# Patient Record
Sex: MALE | Race: WHITE
[De-identification: names, ages, dates, MRNs, and addresses within clinical notes are randomized per-mention and may not be internally consistent; named-entity substitution may affect disease eponyms.]

---

## 2017-12-06 ENCOUNTER — HOSPITAL ENCOUNTER (OUTPATIENT)
Dept: HOSPITAL 39 - GMAM | Age: 67
Discharge: HOME | End: 2017-12-06
Attending: FAMILY MEDICINE
Payer: MEDICARE

## 2017-12-06 DIAGNOSIS — Z12.5: Primary | ICD-10-CM

## 2018-12-13 ENCOUNTER — HOSPITAL ENCOUNTER (OUTPATIENT)
Dept: HOSPITAL 39 - GMAM | Age: 68
End: 2018-12-13
Attending: FAMILY MEDICINE
Payer: MEDICARE

## 2018-12-13 DIAGNOSIS — Z12.5: Primary | ICD-10-CM

## 2019-09-12 ENCOUNTER — HOSPITAL ENCOUNTER (OUTPATIENT)
Dept: HOSPITAL 39 - GMAM | Age: 69
End: 2019-09-12
Attending: FAMILY MEDICINE
Payer: MEDICARE

## 2019-09-12 DIAGNOSIS — E03.9: Primary | ICD-10-CM

## 2019-10-17 ENCOUNTER — HOSPITAL ENCOUNTER (OUTPATIENT)
Dept: HOSPITAL 39 - GMAM | Age: 69
End: 2019-10-17
Attending: FAMILY MEDICINE
Payer: MEDICARE

## 2019-10-17 DIAGNOSIS — E03.9: Primary | ICD-10-CM

## 2019-11-13 ENCOUNTER — HOSPITAL ENCOUNTER (OUTPATIENT)
Dept: HOSPITAL 39 - GMAM | Age: 69
End: 2019-11-13
Attending: FAMILY MEDICINE
Payer: MEDICARE

## 2019-11-13 DIAGNOSIS — E03.9: Primary | ICD-10-CM

## 2020-11-29 ENCOUNTER — HOSPITAL ENCOUNTER (EMERGENCY)
Dept: HOSPITAL 39 - ER | Age: 70
LOS: 1 days | Discharge: HOME | End: 2020-11-30
Payer: MEDICARE

## 2020-11-29 DIAGNOSIS — I10: ICD-10-CM

## 2020-11-29 DIAGNOSIS — J12.89: ICD-10-CM

## 2020-11-29 DIAGNOSIS — U07.1: Primary | ICD-10-CM

## 2020-11-29 DIAGNOSIS — Z79.899: ICD-10-CM

## 2020-11-29 DIAGNOSIS — E87.6: ICD-10-CM

## 2020-11-29 PROCEDURE — 85730 THROMBOPLASTIN TIME PARTIAL: CPT

## 2020-11-29 PROCEDURE — 87040 BLOOD CULTURE FOR BACTERIA: CPT

## 2020-11-29 PROCEDURE — 83735 ASSAY OF MAGNESIUM: CPT

## 2020-11-29 PROCEDURE — 86140 C-REACTIVE PROTEIN: CPT

## 2020-11-29 PROCEDURE — 82553 CREATINE MB FRACTION: CPT

## 2020-11-29 PROCEDURE — 85610 PROTHROMBIN TIME: CPT

## 2020-11-29 PROCEDURE — 85384 FIBRINOGEN ACTIVITY: CPT

## 2020-11-29 PROCEDURE — 83615 LACTATE (LD) (LDH) ENZYME: CPT

## 2020-11-29 PROCEDURE — 94640 AIRWAY INHALATION TREATMENT: CPT

## 2020-11-29 PROCEDURE — 82550 ASSAY OF CK (CPK): CPT

## 2020-11-29 PROCEDURE — 71045 X-RAY EXAM CHEST 1 VIEW: CPT

## 2020-11-29 PROCEDURE — 85379 FIBRIN DEGRADATION QUANT: CPT

## 2020-11-29 PROCEDURE — 80053 COMPREHEN METABOLIC PANEL: CPT

## 2020-11-29 PROCEDURE — 85025 COMPLETE CBC W/AUTO DIFF WBC: CPT

## 2020-11-29 PROCEDURE — 84484 ASSAY OF TROPONIN QUANT: CPT

## 2020-11-29 PROCEDURE — 83605 ASSAY OF LACTIC ACID: CPT

## 2020-11-29 PROCEDURE — 82728 ASSAY OF FERRITIN: CPT

## 2020-11-29 PROCEDURE — 83880 ASSAY OF NATRIURETIC PEPTIDE: CPT

## 2020-11-29 NOTE — ED.PDOC
History of Present Illness





- General


Chief Complaint: Respiratory Problem


Stated Complaint: COVID+, low sats


Time Seen by Provider: 11/29/20 19:49


Source: patient


Exam Limitations: no limitations





- History of Present Illness


Initial Comments: 





The patient is a 70-year-old  male presented to the emergency room 

secondary to increasing shortness of breath.  The patient was diagnosed with 

coronavirus around a week ago.  He has had body aches and some mild sore throat.

 The sore throat is largely resolved along with the runny nose several days ago.

 He does have a pulse oximeter at home it was reading in the 80s.  However since

he is arrived here our pulse oximetry is shown the lowest oxygen saturation for 

any significant period of time to be only around 90%.  He averages between 90 

and 94% on room air at rest.  Lung fields are actually fairly clear to 

auscultation.  He is not in respiratory distress.  He is alert and oriented.  He

does have a significant cough.  No nausea vomiting or diarrhea according to him.


Timing/Duration: 1 week, constant, getting worse


Severity: moderate


Improving Factors: nothing


Worsening Factors: nothing


Associated Symptoms: cough, loss of appetite, malaise, shortness of breath - 

Mild


Allergies/Adverse Reactions: 


Allergies





NO KNOWN ALLERGY Allergy (Verified 11/29/20 20:28)


   





Home Medications: 


Ambulatory Orders





Amlodipine Besylate 5 mg PO DAILY 11/29/20 


Azithromycin 500 mg PO DAILY #5 tab 11/29/20 


Dexamethasone Tab [Decadron Tab] 4 mg PO DAILY #7 tab 11/29/20 


Hydrochlorothiazide 12.5 mg PO DAILY 11/29/20 


Potassium Chloride [Potassium Chloride ER] 20 meq PO DAILY #14 tab 11/29/20 











Review of Systems





- Review of Systems


Constitutional: States: fever, malaise


EENTM: States: nose congestion - Largely resolved , throat pain - Largely 

resolved


Respiratory: States: cough, short of breath


Cardiology: States: no symptoms reported


Gastrointestinal/Abdominal: States: no symptoms reported


Genitourinary: States: no symptoms reported


Musculoskeletal: States: no symptoms reported


Skin: States: no symptoms reported


Neurological: States: headache


Endocrine: States: no symptoms reported


All other Systems: No Change from Baseline





Past Medical History (General)





- Patient Medical History


Hx Seizures: No


Hx Stroke: No


Hx Dementia: No


Hx Asthma: No


Hx of COPD: No


Hx Cardiac Disorders: No


Hx Congestive Heart Failure: No


Hx Pacemaker: No


Hx Hypertension: Yes


Hx Thyroid Disease: No


Hx Diabetes: No


Hx Gastroesophageal Reflux: No


Hx Renal Disease: No


Hx Cancer: No


Hx of HIV: No


Hx Hepatitis C: No


Hx MRSA: No





- Vaccination History


Hx Tetanus, Diphtheria Vaccination: Yes


Hx Influenza Vaccination: Yes


Hx Pneumococcal Vaccination: Yes





Family Medical History





- Family History


  ** Father


Family History: Unknown





Physical Exam





- Physical Exam


General Appearance: Alert, Anxious, No apparent distress


Eye Exam: bilateral normal


Ears, Nose, Throat: hearing grossly normal


Neck: full range of motion, supple


Respiratory: normal breath sounds, no respiratory distress, no accessory muscle 

use


Cardiovascular/Chest: normal peripheral pulses, regular rate, rhythm, no edema


Peripheral Pulses: radial,right: 2+, radial,left: 2+


Gastrointestinal/Abdominal: non tender, soft


Rectal Exam: deferred


Back Exam: no CVA tenderness, no vertebral tenderness


Extremity: normal range of motion, non-tender, normal inspection, no pedal 

edema, normal capillary refill


Neurologic: CNs II-XII nml as tested, alert, normal mood/affect - anxious, 

oriented x 3


Skin Exam: normal color


Comments: 





                               Vital Signs - 24 hr











  11/29/20 11/29/20 11/29/20





  19:50 20:24 20:30


 


Temperature  100.1 F H 


 


Pulse Rate [  98 H 90





monitor]   


 


Respiratory 20 20 16





Rate   


 


Blood Pressure  128/86 126/75





[Right Arm]   


 


O2 Sat by Pulse  94 L 93 L





Oximetry   














Progress





- Progress


Progress: 





11/29/20 21:18


The patient is a 70-year-old  male presented emergency room secondary 

to coronavirus pneumonia with some increasing shortness of breath.  He has not 

technically hypoxic but his oxygen saturations are lower than what would be 

normal.  He does have infiltrates on his chest x-ray he does have some markers 

for inflammation on his laboratory work.  The patient based on his age and 

symptomatology he does appear to be a good candidate for monoclonal antibody 

therapy.  He is going to receive an infusion of that here and be monitored for 

it.  The patient is additionally being started on an oral steroid and 

azithromycin.  He will have a trial of the nebulizer treatment.  If these are 

tolerated well but he will be sent home with prescriptions for these.  He will 

otherwise need to follow-up with his primary care doctor in the coming week and 

of course continue to isolate.  He does need to return to the emergency room for

any worsening shortness of breath.  He may need to check the batteries on his 

pulse oximeter at home.  The patient did have some hypokalemia here and did 

receive some supplemental potassium.  He will be placed on 2 weeks of oral 

potassium and does need to have this followed as an outpatient.  ER warnings are

given.





willy aguilar [id number]


11/29/20 21:20








- Results/Orders


Results/Orders: 


cxr consistent with covid with lll infiltrates. 





EKG shows normal sinus rhythm at 94 bpm.  Normal axis.  Normal R wave 

progression.  No ST segment or T wave changes indicative of acute ischemia.  

Normal QT interval.


                                Laboratory Tests











  11/29/20 11/29/20 11/29/20





  20:15 20:15 20:15


 


WBC   3.8 L 


 


RBC   4.54 L 


 


Hgb   13.9 L 


 


Hct   39.3 L 


 


MCV   86.5 


 


MCH   30.5 


 


MCHC   35.3 


 


RDW   12.8 


 


Plt Count   180 


 


MPV   7.7 


 


Absolute Neuts (auto)   2.90 


 


Absolute Lymphs (auto)   0.60 L 


 


Absolute Monos (auto)   0.30 


 


Absolute Eos (auto)   0.00 


 


Absolute Basos (auto)   0.00 


 


Neutrophils %   76.9 


 


Lymphocytes %   14.8 L 


 


Monocytes %   8.0 


 


Eosinophils %   0.1 L 


 


Basophils %   0.2 


 


PT    9.6


 


INR    < 1.00


 


PTT (SP)    30.0


 


D-Dimer, Quantitative    366.0


 


Sodium  132 L  


 


Potassium  2.9 L  


 


Chloride  95 L  


 


Carbon Dioxide  26  


 


Anion Gap  13.9  


 


BUN  11  


 


Creatinine  0.97  


 


BUN/Creatinine Ratio  11.3  


 


Random Glucose  125 H  


 


Serum Osmolality  265.4 L  


 


Lactic Acid   


 


Calcium  7.8 L  


 


Magnesium  2.2  


 


Ferritin   


 


Total Bilirubin  0.7  


 


AST  42  


 


ALT  32  


 


Alkaline Phosphatase  45  


 


LD Total   


 


Creatine Kinase  592 H*  


 


CK-MB (CK-2)  5.8 H*  


 


CK-MB (CK-2) %  0.98  


 


Troponin I  < 0.02  


 


C-Reactive Protein   


 


B-Natriuretic Peptide  35.4  


 


Serum Total Protein  7.2  


 


Albumin  3.6  


 


Globulin  3.6 H  


 


Albumin/Globulin Ratio  1.0 L  














  11/29/20 11/29/20 11/29/20





  20:15 20:15 20:15


 


WBC   


 


RBC   


 


Hgb   


 


Hct   


 


MCV   


 


MCH   


 


MCHC   


 


RDW   


 


Plt Count   


 


MPV   


 


Absolute Neuts (auto)   


 


Absolute Lymphs (auto)   


 


Absolute Monos (auto)   


 


Absolute Eos (auto)   


 


Absolute Basos (auto)   


 


Neutrophils %   


 


Lymphocytes %   


 


Monocytes %   


 


Eosinophils %   


 


Basophils %   


 


PT   


 


INR   


 


PTT (SP)   


 


D-Dimer, Quantitative   


 


Sodium   


 


Potassium   


 


Chloride   


 


Carbon Dioxide   


 


Anion Gap   


 


BUN   


 


Creatinine   


 


BUN/Creatinine Ratio   


 


Random Glucose   


 


Serum Osmolality   


 


Lactic Acid  1.0  


 


Calcium   


 


Magnesium   


 


Ferritin   411.0 H 


 


Total Bilirubin   


 


AST   


 


ALT   


 


Alkaline Phosphatase   


 


LD Total    208 H


 


Creatine Kinase   


 


CK-MB (CK-2)   


 


CK-MB (CK-2) %   


 


Troponin I   


 


C-Reactive Protein    5.9 H


 


B-Natriuretic Peptide   


 


Serum Total Protein   


 


Albumin   


 


Globulin   


 


Albumin/Globulin Ratio   














Departure





- Departure


Clinical Impression: 


 Pneumonia due to 2019 novel coronavirus, Hypokalemia





Disposition: Discharge to Home or Self Care


Condition: Fair


Departure Forms:  ED Discharge - Pt. Copy, Patient Portal Self Enrollment


Instructions:  Hypokalemia (DC), Coronavirus Disease 2019 (COVID-19)


Diet: regular diet


Activity: increase activity as tolerated


Referrals: 


Alex Aguilar MD [Primary Care Provider] - 1-2 Weeks


Prescriptions: 


Azithromycin 500 mg PO DAILY #5 tab


Dexamethasone Tab [Decadron Tab] 4 mg PO DAILY #7 tab


Potassium Chloride [Potassium Chloride ER] 20 meq PO DAILY #14 tab


Home Medications: 


Ambulatory Orders





Amlodipine Besylate 5 mg PO DAILY 11/29/20 


Azithromycin 500 mg PO DAILY #5 tab 11/29/20 


Dexamethasone Tab [Decadron Tab] 4 mg PO DAILY #7 tab 11/29/20 


Hydrochlorothiazide 12.5 mg PO DAILY 11/29/20 


Potassium Chloride [Potassium Chloride ER] 20 meq PO DAILY #14 tab 11/29/20 








Additional Instructions: 


The patient is a 70-year-old  male presented emergency room secondary 

to coronavirus pneumonia with some increasing shortness of breath.  He has not 

technically hypoxic but his oxygen saturations are lower than what would be 

normal.  He does have infiltrates on his chest x-ray he does have some markers 

for inflammation on his laboratory work.  The patient based on his age and 

symptomatology he does appear to be a good candidate for monoclonal antibody 

therapy.  He is going to receive an infusion of that here and be monitored for 

it.  The patient is additionally being started on an oral steroid and 

azithromycin.  He will have a trial of the nebulizer treatment.  If these are 

tolerated well but he will be sent home with prescriptions for these.  He will 

otherwise need to follow-up with his primary care doctor in the coming week and 

of course continue to isolate.  He does need to return to the emergency room for

any worsening shortness of breath.  He may need to check the batteries on his 

pulse oximeter at home.  The patient did have some hypokalemia here and did 

receive some supplemental potassium.  He will be placed on 2 weeks of oral 

potassium and does need to have this followed as an outpatient.  ER warnings are

given.

## 2020-11-29 NOTE — RAD
EXAM DESCRIPTION:



Chest,1 View



CLINICAL HISTORY: 



70 years  Male, covid sob



COMPARISON: 



None



TECHNIQUE: 



Single AP chest radiograph.



FINDINGS: 



Nonspecific hazy opacities in the periphery of the left lower

lung. Query right basilar opacities as well. No pneumothorax or

pleural effusion. Normal cardiomediastinal contour. Normal

osseous structures.



IMPRESSION: 



1.  Bilateral pulmonary opacities suggesting viral pneumonitis

and consistent with provided history of COVID.



Electronically signed by:  Aric Bejarano MD  11/29/2020 8:43 PM

CST Workstation: [id number]

## 2020-11-30 VITALS — OXYGEN SATURATION: 92 % | TEMPERATURE: 98.9 F

## 2020-11-30 VITALS — SYSTOLIC BLOOD PRESSURE: 113 MMHG | DIASTOLIC BLOOD PRESSURE: 64 MMHG
